# Patient Record
(demographics unavailable — no encounter records)

---

## 2025-05-13 NOTE — HISTORY OF PRESENT ILLNESS
[FreeTextEntry1] : 47 year old P2 LMP: 5/4/25 presents for annual gyn visit. Pt feels well and has no complaints. She has normal menses. She denies intermenstrual bleeding, itching, burning, or abnormal discharge. She reports increasing intervals between menstrual cycles.    [Withdrawal] : uses withdrawal [Y] : Patient is sexually active [Mammogramdate] : 06/24 [BreastSonogramDate] : 06/24 [PapSmeardate] : 04/24 [ColonoscopyDate] : 2019 [FreeTextEntry3] : Withdrawal

## 2025-05-13 NOTE — PHYSICAL EXAM
[MA] : MA [FreeTextEntry2] : Sandra [Appropriately responsive] : appropriately responsive [Alert] : alert [No Acute Distress] : no acute distress [No Lymphadenopathy] : no lymphadenopathy [Soft] : soft [Non-tender] : non-tender [Non-distended] : non-distended [No HSM] : No HSM [No Lesions] : no lesions [No Mass] : no mass [Oriented x3] : oriented x3 [Examination Of The Breasts] : a normal appearance [No Discharge] : no discharge [No Masses] : no breast masses were palpable [Labia Majora] : normal [Labia Minora] : normal [Normal] : normal [Uterine Adnexae] : normal

## 2025-05-13 NOTE — SIGNATURES
[TextEntry] : This note was written by Keya Rojas on 05/13/2025 actively solely DANNI Sosa M.D  05/13/2025. All medical record entries made by this scribe were at my  DANNI Sosa M.D  direction and personally dictated by me on  05/13/2025. I have personally reviewed my chart and agree that the record reflects my personal performance of the history, physical exam, assessment, and plan.